# Patient Record
Sex: MALE | URBAN - METROPOLITAN AREA
[De-identification: names, ages, dates, MRNs, and addresses within clinical notes are randomized per-mention and may not be internally consistent; named-entity substitution may affect disease eponyms.]

---

## 2021-03-20 ENCOUNTER — APPOINTMENT (RX ONLY)
Dept: URBAN - METROPOLITAN AREA CLINIC 98 | Facility: CLINIC | Age: 46
Setting detail: DERMATOLOGY
End: 2021-03-20

## 2021-03-20 DIAGNOSIS — Z00.8 ENCOUNTER FOR OTHER GENERAL EXAMINATION: ICD-10-CM

## 2021-03-20 DIAGNOSIS — D17 BENIGN LIPOMATOUS NEOPLASM: ICD-10-CM

## 2021-03-20 PROBLEM — D17.1 BENIGN LIPOMATOUS NEOPLASM OF SKIN AND SUBCUTANEOUS TISSUE OF TRUNK: Status: ACTIVE | Noted: 2021-03-20

## 2021-03-20 PROBLEM — R22.9 LOCALIZED SWELLING, MASS AND LUMP, UNSPECIFIED: Status: ACTIVE | Noted: 2021-03-20

## 2021-03-20 PROCEDURE — 99072 ADDL SUPL MATRL&STAF TM PHE: CPT

## 2021-03-20 PROCEDURE — ? CPT CODE GENERATOR

## 2021-03-20 PROCEDURE — ? PHE RELATED SUPPLIES PROVIDED/DISPENSED

## 2021-03-20 PROCEDURE — ? ADDITIONAL NOTES

## 2021-03-20 PROCEDURE — ? COUNSELING

## 2021-03-20 PROCEDURE — 99202 OFFICE O/P NEW SF 15 MIN: CPT

## 2021-03-20 PROCEDURE — ? OTHER

## 2021-03-20 ASSESSMENT — LOCATION ZONE DERM: LOCATION ZONE: TRUNK

## 2021-03-20 ASSESSMENT — LOCATION SIMPLE DESCRIPTION DERM: LOCATION SIMPLE: RIGHT UPPER BACK

## 2021-03-20 ASSESSMENT — LOCATION DETAILED DESCRIPTION DERM: LOCATION DETAILED: RIGHT MEDIAL UPPER BACK

## 2021-03-20 NOTE — HPI: SUBCUTANEOUS NODULE
Has Your Skin Lesion Been Treated?: not been treated
Is This A New Presentation, Or A Follow-Up?: Subcutaneous Nodule
Additional History: Patient has a SQ nodule to the right index finger with a lesion in the center.

## 2021-03-20 NOTE — PROCEDURE: CPT CODE GENERATOR
Lorazepam (J-Code: ) Price: 0.00
Show Shave Removal Codes?: No
Number Of Tubes Or Sticks Used?: 1
Anticipated Depth And Size Of Soft Tissue Excision (Required): Subcutaneous, Less than 2 cm
Fee Schedule Discount For Cash Pay Patients In % Off Of Fee Schedule: 0
First Biopsy Type: Tangential Biopsy
How Many Lesions Are You Destroying?: Less than 15
Anticipated Depth And Size Of Soft Tissue Excision (Required): Subcutaneous, Less than 1.5 cm
Which Photosensitizer Was Used?: Levulan
Number Of Lesions Injected: Less than 8 lesions
Anticipated Excised Diameter (Required): 2.1 - 3.0 cm
Location (Required): Trunk
Anticipated Wound Length (Required): 2.6-5.0 cm
First Procedure You Would Like A Quote For?: Benign Excision
Anticipated Depth And Size Of Soft Tissue Excision (Required): Subcutaneous, Less than 3 cm
Fee Schedule Discount In % Off Of Fee Schedule: Standard Fee Schedule as Entered in Pricing Tab
Detail Level: None
Repair: Intermediate Primary Closure

## 2021-03-20 NOTE — PROCEDURE: ADDITIONAL NOTES
Detail Level: Zone
Additional Notes: ***Due to growth, recommend excision with pathology. Pt quoted $706.92 for excision.
Render Risk Assessment In Note?: no

## 2021-03-20 NOTE — PROCEDURE: OTHER
Detail Level: Zone
Render Risk Assessment In Note?: no
Other (Free Text): Recommend referral to hand surgeon.
Note Text (......Xxx Chief Complaint.): This diagnosis correlates with the